# Patient Record
Sex: MALE | Race: ASIAN | NOT HISPANIC OR LATINO | URBAN - METROPOLITAN AREA
[De-identification: names, ages, dates, MRNs, and addresses within clinical notes are randomized per-mention and may not be internally consistent; named-entity substitution may affect disease eponyms.]

---

## 2023-02-11 ENCOUNTER — EMERGENCY (EMERGENCY)
Facility: HOSPITAL | Age: 73
LOS: 1 days | Discharge: HOME | End: 2023-02-11
Attending: EMERGENCY MEDICINE
Payer: MEDICARE

## 2023-02-11 VITALS
TEMPERATURE: 98 F | OXYGEN SATURATION: 99 % | SYSTOLIC BLOOD PRESSURE: 143 MMHG | DIASTOLIC BLOOD PRESSURE: 83 MMHG | HEIGHT: 65 IN | HEART RATE: 70 BPM | RESPIRATION RATE: 18 BRPM | WEIGHT: 138.01 LBS

## 2023-02-11 VITALS
RESPIRATION RATE: 21 BRPM | HEART RATE: 72 BPM | SYSTOLIC BLOOD PRESSURE: 120 MMHG | OXYGEN SATURATION: 100 % | DIASTOLIC BLOOD PRESSURE: 74 MMHG | TEMPERATURE: 99 F

## 2023-02-11 LAB
ALBUMIN SERPL ELPH-MCNC: 3.9 G/DL — SIGNIFICANT CHANGE UP (ref 3.3–5)
ALBUMIN SERPL ELPH-MCNC: 4.2 G/DL — SIGNIFICANT CHANGE UP (ref 3.3–5)
ALP SERPL-CCNC: 59 U/L — SIGNIFICANT CHANGE UP (ref 40–120)
ALP SERPL-CCNC: 62 U/L — SIGNIFICANT CHANGE UP (ref 40–120)
ALT FLD-CCNC: 28 U/L — SIGNIFICANT CHANGE UP (ref 10–45)
ALT FLD-CCNC: 28 U/L — SIGNIFICANT CHANGE UP (ref 10–45)
ANION GAP SERPL CALC-SCNC: 10 MMOL/L — SIGNIFICANT CHANGE UP (ref 5–17)
ANION GAP SERPL CALC-SCNC: 9 MMOL/L — SIGNIFICANT CHANGE UP (ref 5–17)
APTT BLD: 34.5 SEC — SIGNIFICANT CHANGE UP (ref 27.5–35.5)
AST SERPL-CCNC: 37 U/L — SIGNIFICANT CHANGE UP (ref 10–40)
AST SERPL-CCNC: 59 U/L — HIGH (ref 10–40)
BASOPHILS # BLD AUTO: 0.03 K/UL — SIGNIFICANT CHANGE UP (ref 0–0.2)
BASOPHILS NFR BLD AUTO: 0.4 % — SIGNIFICANT CHANGE UP (ref 0–2)
BILIRUB SERPL-MCNC: 0.3 MG/DL — SIGNIFICANT CHANGE UP (ref 0.2–1.2)
BILIRUB SERPL-MCNC: 0.6 MG/DL — SIGNIFICANT CHANGE UP (ref 0.2–1.2)
BUN SERPL-MCNC: 18 MG/DL — SIGNIFICANT CHANGE UP (ref 7–23)
BUN SERPL-MCNC: 18 MG/DL — SIGNIFICANT CHANGE UP (ref 7–23)
CALCIUM SERPL-MCNC: 10.1 MG/DL — SIGNIFICANT CHANGE UP (ref 8.4–10.5)
CALCIUM SERPL-MCNC: 9.8 MG/DL — SIGNIFICANT CHANGE UP (ref 8.4–10.5)
CHLORIDE SERPL-SCNC: 102 MMOL/L — SIGNIFICANT CHANGE UP (ref 96–108)
CHLORIDE SERPL-SCNC: 107 MMOL/L — SIGNIFICANT CHANGE UP (ref 96–108)
CO2 SERPL-SCNC: 23 MMOL/L — SIGNIFICANT CHANGE UP (ref 22–31)
CO2 SERPL-SCNC: 23 MMOL/L — SIGNIFICANT CHANGE UP (ref 22–31)
CREAT SERPL-MCNC: 1.34 MG/DL — HIGH (ref 0.5–1.3)
CREAT SERPL-MCNC: 1.4 MG/DL — HIGH (ref 0.5–1.3)
EGFR: 53 ML/MIN/1.73M2 — LOW
EGFR: 56 ML/MIN/1.73M2 — LOW
EOSINOPHIL # BLD AUTO: 0.42 K/UL — SIGNIFICANT CHANGE UP (ref 0–0.5)
EOSINOPHIL NFR BLD AUTO: 5.3 % — SIGNIFICANT CHANGE UP (ref 0–6)
FLUAV AG NPH QL: SIGNIFICANT CHANGE UP
FLUBV AG NPH QL: SIGNIFICANT CHANGE UP
GLUCOSE SERPL-MCNC: 172 MG/DL — HIGH (ref 70–99)
GLUCOSE SERPL-MCNC: 220 MG/DL — HIGH (ref 70–99)
HCT VFR BLD CALC: 47.9 % — SIGNIFICANT CHANGE UP (ref 39–50)
HGB BLD-MCNC: 15.3 G/DL — SIGNIFICANT CHANGE UP (ref 13–17)
IMM GRANULOCYTES NFR BLD AUTO: 0.3 % — SIGNIFICANT CHANGE UP (ref 0–0.9)
INR BLD: 1.09 RATIO — SIGNIFICANT CHANGE UP (ref 0.88–1.16)
LIDOCAIN IGE QN: 67 U/L — HIGH (ref 7–60)
LYMPHOCYTES # BLD AUTO: 1.56 K/UL — SIGNIFICANT CHANGE UP (ref 1–3.3)
LYMPHOCYTES # BLD AUTO: 19.8 % — SIGNIFICANT CHANGE UP (ref 13–44)
MCHC RBC-ENTMCNC: 28 PG — SIGNIFICANT CHANGE UP (ref 27–34)
MCHC RBC-ENTMCNC: 31.9 GM/DL — LOW (ref 32–36)
MCV RBC AUTO: 87.7 FL — SIGNIFICANT CHANGE UP (ref 80–100)
MONOCYTES # BLD AUTO: 0.78 K/UL — SIGNIFICANT CHANGE UP (ref 0–0.9)
MONOCYTES NFR BLD AUTO: 9.9 % — SIGNIFICANT CHANGE UP (ref 2–14)
NEUTROPHILS # BLD AUTO: 5.07 K/UL — SIGNIFICANT CHANGE UP (ref 1.8–7.4)
NEUTROPHILS NFR BLD AUTO: 64.3 % — SIGNIFICANT CHANGE UP (ref 43–77)
NRBC # BLD: 0 /100 WBCS — SIGNIFICANT CHANGE UP (ref 0–0)
PLATELET # BLD AUTO: 214 K/UL — SIGNIFICANT CHANGE UP (ref 150–400)
POTASSIUM SERPL-MCNC: 5.6 MMOL/L — HIGH (ref 3.5–5.3)
POTASSIUM SERPL-MCNC: SIGNIFICANT CHANGE UP (ref 3.5–5.3)
POTASSIUM SERPL-SCNC: 5.6 MMOL/L — HIGH (ref 3.5–5.3)
POTASSIUM SERPL-SCNC: SIGNIFICANT CHANGE UP (ref 3.5–5.3)
PROT SERPL-MCNC: 7 G/DL — SIGNIFICANT CHANGE UP (ref 6–8.3)
PROT SERPL-MCNC: 7.8 G/DL — SIGNIFICANT CHANGE UP (ref 6–8.3)
PROTHROM AB SERPL-ACNC: 12.6 SEC — SIGNIFICANT CHANGE UP (ref 10.5–13.4)
RBC # BLD: 5.46 M/UL — SIGNIFICANT CHANGE UP (ref 4.2–5.8)
RBC # FLD: 13.2 % — SIGNIFICANT CHANGE UP (ref 10.3–14.5)
RSV RNA NPH QL NAA+NON-PROBE: SIGNIFICANT CHANGE UP
SARS-COV-2 RNA SPEC QL NAA+PROBE: SIGNIFICANT CHANGE UP
SODIUM SERPL-SCNC: 135 MMOL/L — SIGNIFICANT CHANGE UP (ref 135–145)
SODIUM SERPL-SCNC: 139 MMOL/L — SIGNIFICANT CHANGE UP (ref 135–145)
TROPONIN T, HIGH SENSITIVITY RESULT: 8 NG/L — SIGNIFICANT CHANGE UP (ref 0–51)
TROPONIN T, HIGH SENSITIVITY RESULT: 9 NG/L — SIGNIFICANT CHANGE UP (ref 0–51)
WBC # BLD: 7.88 K/UL — SIGNIFICANT CHANGE UP (ref 3.8–10.5)
WBC # FLD AUTO: 7.88 K/UL — SIGNIFICANT CHANGE UP (ref 3.8–10.5)

## 2023-02-11 PROCEDURE — 99285 EMERGENCY DEPT VISIT HI MDM: CPT | Mod: 25

## 2023-02-11 PROCEDURE — 93005 ELECTROCARDIOGRAM TRACING: CPT

## 2023-02-11 PROCEDURE — 87637 SARSCOV2&INF A&B&RSV AMP PRB: CPT

## 2023-02-11 PROCEDURE — 71045 X-RAY EXAM CHEST 1 VIEW: CPT | Mod: 26

## 2023-02-11 PROCEDURE — 85610 PROTHROMBIN TIME: CPT

## 2023-02-11 PROCEDURE — 83690 ASSAY OF LIPASE: CPT

## 2023-02-11 PROCEDURE — 80053 COMPREHEN METABOLIC PANEL: CPT

## 2023-02-11 PROCEDURE — 85730 THROMBOPLASTIN TIME PARTIAL: CPT

## 2023-02-11 PROCEDURE — 99285 EMERGENCY DEPT VISIT HI MDM: CPT | Mod: CS

## 2023-02-11 PROCEDURE — 85379 FIBRIN DEGRADATION QUANT: CPT

## 2023-02-11 PROCEDURE — 71045 X-RAY EXAM CHEST 1 VIEW: CPT

## 2023-02-11 PROCEDURE — 85025 COMPLETE CBC W/AUTO DIFF WBC: CPT

## 2023-02-11 PROCEDURE — 84484 ASSAY OF TROPONIN QUANT: CPT

## 2023-02-11 RX ORDER — ASPIRIN/CALCIUM CARB/MAGNESIUM 324 MG
162 TABLET ORAL ONCE
Refills: 0 | Status: COMPLETED | OUTPATIENT
Start: 2023-02-11 | End: 2023-02-11

## 2023-02-11 RX ADMIN — Medication 162 MILLIGRAM(S): at 11:05

## 2023-02-11 NOTE — ED PROVIDER NOTE - NSFOLLOWUPINSTRUCTIONS_ED_ALL_ED_FT
1. You presented to the emergency department for:     2. Your evaluation in the emergency department included a physician evaluation. Your work-up did not reveal any findings indicating the need for admission to the hospital or any emergent interventions at this time.     3. It is recommended that you follow-up with Dr. Real on Monday. Call the office at 9 AM    If needed, to arrange an appointment with a primary care provider please call: [phone]    4. Please continue taking your regular medications as prescribed.     For pain you may take 400-600 mg IBUPROFEN or 500-1000mg ACETAMINOPHEN every 6-8 hours - as needed.  This is an over-the-counter medication - please read the instructions for use and warnings on the label. If you have any questions regarding its use, you may refer them to your local pharmacist.    5. PLEASE RETURN TO THE EMERGENCY DEPARTMENT IMMEDIATELY IF you develop any fevers not responding to over the counter medications, uncontrollable nausea and vomiting, an inability to tolerate eating and drinking, difficulty breathing, chest pain, a severe increase in your symptoms or pain, or any other new symptoms that concern you. 1. You presented to the emergency department for: CHEST PAIN.    2. Your evaluation in the emergency department included a physician evaluation. Your work-up did not reveal any findings indicating the need for admission to the hospital or any emergent interventions at this time.     3. It is recommended that you follow-up with Dr. Real on Monday. Call the office at 9 AM to make the appointment.     If needed, to arrange an appointment with a primary care provider please call: [phone]    4. Please continue taking your regular medications as prescribed.     5. PLEASE RETURN TO THE EMERGENCY DEPARTMENT IMMEDIATELY IF you develop any fevers not responding to over the counter medications, uncontrollable nausea and vomiting, an inability to tolerate eating and drinking, difficulty breathing, chest pain, a severe increase in your symptoms or pain, or any other new symptoms that concern you.

## 2023-02-11 NOTE — ED PROVIDER NOTE - ATTENDING CONTRIBUTION TO CARE
RGUJRAL 73-year-old male history listed brought in by his wife for chest pain since yesterday.  Patient states that pain started after drinking water and felt something was stuck. Denies history of reflux or gastritis.  Patient had recurrent episode this morning wo any food. Pt unable to describe type of pain, complained of dull pressure to wife.  Denies any shortness of breath or cough, abdominal pain.  Patient did travel to [place] by car returned 1 week ago.  Also had a stress test 1 year ago.  As per wife patient appeared more fatigued this morning and was concerned and brought him to the hospital.  On exam.   Check labs evaluate for not limited to ACS, PE. Patient has been tolerating p.o. including dinner and breakfast this morning without any symptoms.

## 2023-02-11 NOTE — ED ADULT NURSE NOTE - CINV DISCH MEDS REVIEWED YN
-- DO NOT REPLY / DO NOT REPLY ALL --  -- Message is from the [facility]--    COVID-19 Universal Screening: Negative    General Patient Message      Reason for Call: Please follow up with patient as soon as possible. She was trying to schedule an appointment for labs. There are no recent order for labs.  on 21.     Caller Information       Type Contact Phone    04/15/2021 12:55 PM CDT Phone (Incoming) Aditi Sow (Self) [phone] (M)          Alternative phone number: None     Turnaround time given to caller:   \"This message will be sent to [state Provider's name]. The clinical team will fulfill your request as soon as they review your message.\"    
Labs extended, patient notified via telephone  
Yes

## 2023-02-11 NOTE — ED PROVIDER NOTE - OBJECTIVE STATEMENT
73-year-old male with history of type 2 diabetes, hyperlipidemia, hypothyroidism presenting with intermittent chest pain since yesterday.  Describes pain as midsternal, nonradiating.  Started while he was drinking water and self resolved.  Reports recurrent episodes since, nonexertional.  Walked on a treadmill this morning without symptoms.  Wife at bedside states that he just did not look himself.  Patient reports normal nuclear stress test 1 year ago.  No additional cardiac work-up.  Remote history of smoking, quit 30 to 40 years ago. Denies shortness of breath, palpitations, diaphoresis.  Recently traveled long distance but denies leg swelling, pleuritic chest pain.

## 2023-02-11 NOTE — ED ADULT TRIAGE NOTE - CHIEF COMPLAINT QUOTE
chest tightness that began yesterday night while drinking water. Took tums with mild relief.  Pain is midsternal does not radiate

## 2023-02-11 NOTE — ED PROVIDER NOTE - CLINICAL SUMMARY MEDICAL DECISION MAKING FREE TEXT BOX
73-year-old no previous cardiac history, history of diabetes and hyperlipidemia presenting with intermittent chest pain.  Nonexertional nonradiating midsternal pain.  Currently asymptomatic.  EKG with T wave inversions laterally and aVL, no ST elevations.  Overall moderate risk for cardiac cause of pain.  Will get labs, troponin, chest x-ray, give aspirin and discuss disposition with patient's primary doctor (zachary Real), anticipate CDU versus admission.

## 2023-02-11 NOTE — ED ADULT NURSE NOTE - OBJECTIVE STATEMENT
74 y/o M A&O x3 w/ PMH of diabetes, HLD and hypothyroidism presents to the ED complaining of chest pain. Pt states that midsternal pain started yesterday when he was drinking water. Pt states that he took a Tums today and had chest pain relief. Pt also states he was on the treadmill this morning and did not experience any pain. Pt's wife is physician and wanted to bring him to ED. Pt states he saw cardiologist 2 weeks ago and successfully completed stress test. Pt denies that pain radiates anywhere and states it is a "chest tightness." Pt states he was sob yesterday but denies current sob. Pt also denies n/v/d, fever, chills, body aches or weakness. Pt safety and comfort provided.

## 2023-02-11 NOTE — ED PROVIDER NOTE - PATIENT PORTAL LINK FT
You can access the FollowMyHealth Patient Portal offered by [facility] by registering at the following website: [email or web address]. By joining [facility]’s FollowMyHealth portal, you will also be able to view your health information using other applications (apps) compatible with our system.

## 2023-02-11 NOTE — ED PROVIDER NOTE - PROGRESS NOTE DETAILS
EKG compared to prior and no changes, with lateral inversions in I, avl, v5,6. RGUJRAL Discussed with patient and wife. States they will get an outpatient stress test, do not want to wait for delta trop at this time. Risk and return precautions discussed. RGUJRAL

## 2024-03-09 NOTE — ED ADULT TRIAGE NOTE - NS ED TRIAGE AVPU SCALE
Alert-The patient is alert, awake and responds to voice. The patient is oriented to time, place, and person. The triage nurse is able to obtain subjective information.
FAMILY HISTORY:  No pertinent family history in first degree relatives

## 2025-02-03 NOTE — ED PROVIDER NOTE - NSICDXPASTMEDICALHX_GEN_ALL_CORE_FT
Cortisone Injection    You have received a cortisone injection. The medication is a combination of a short acting anesthetic (numbing medication)  plus a steroid.     You may see some relief from the pain for several hours following the injection due to the numbing medication. Later that day or the next day you may have the same pain you had before or an increase in soreness. Swelling can also occur.  You may try a cold compress for 20 minutes on and 20 minutes off that day or over the counter medications with food (if not allergic)    Our expectation would be that you will improve on a day by day basis for the next several weeks or even longer.     Cortisone may cause a temporary (usually 2-3 days) increase in blood glucose (sugar) levels. If you have diabetes, please pay particular attention to this.     Please call the office if there are no signs of improvement after 4 weeks or if other problems develop such as an increase in swelling or redness. Our office number is [phone]    Thank you for allowing us to be of service to you.     [facility].         SHOULDER EXERCISES    Do not ignore pain: You should not feel pain during an exercise. Talk to your doctor or physical therapist if you have any pain while exercising.    Ask questions: If you are not sure how to do an exercise, or how often to do it, contact your doctor or physical therapist.    1. Pendulum    Main muscles worked: Deltoids, supraspinatus, infraspinatus, subscapularis    Equipment needed: None    Repetitions: 2 sets of 10  Days Per Week: 5 to 6    Step-by-step directions    Lean forward and place one hand on a counter or table for support. Let your other arm hang freely at your side.  Gently swing your arm forward and back. Repeat the exercise moving your arm side-to-side, and repeat again in a circular motion.  Repeat the entire sequence with the other arm.  Tip: Do not round your back or lock your knees.      2. Crossover  Arm Stretch    Main muscles worked: Posterior deltoid  You should feel this stretch at the back of your shoulder    Equipment needed: None    Repetitions: 4 each side  Days Per Week: 5 to 6    Step-by-step directions    Relax your shoulders and gently pull one arm across your chest as far as possible, holding at your upper arm.  Hold the stretch for 30 seconds and then relax for 30 seconds.  Repeat with the other arm.  Tip: Do not pull or put pressure on your elbow.      3. Passive Internal Rotation    Main muscles worked: Supraspinatus  You should feel this stretch at the front of your shoulder    Equipment needed: Light stick, such as a yardstick (wooden ruler)    Repetitions: 4 each side  Days Per Week: 5 to 6    Step-by-step directions    Hold a stick behind your back with one hand, and lightly grasp the other end of the stick with your other hand.  Pull the stick horizontally as shown so that your shoulder is passively stretched to the point of feeling a pull without pain.  Hold for 30 seconds and then relax for 30 seconds.  Repeat on the other side.  Tip: Do not lean over or twist to side while pulling the stick.      4. Passive External Rotation    Main muscles worked: Infraspinatus, teres minor  You should feel this stretch in the back of your shoulder    Equipment needed: Light stick, such as a yardstick (wooden ruler)    Repetitions: 4 each side  Days Per Week: 5 to 6    Step-by-step directions    Grasp the stick with one hand and cup the other end of the stick with the other hand.  Keep the elbow of the shoulder you are stretching against the side of your body and push the stick horizontally, as shown, to the point of feeling a pull without pain.  Hold for 30 seconds and then relax for 30 seconds.  Repeat on the other side.  Tip: Keep your hips facing forward and do not twist.      5. Sleeper Stretch    Main muscles worked: Infraspinatus, teres minor  You should feel this stretch in your outer upper back,  behind your shoulder    Equipment needed: None    Repetitions: 4 reps, 3x a day  Days Per Week: Daily    Step-by-step directions    Lie on your side on a firm, flat surface with the affected shoulder under you and your arm bent, as shown. You can place your head on a pillow for comfort, if needed.  Use your unaffected arm to push your other arm down. Stop pressing down when you feel a stretch in the back of your affected shoulder.  Hold this position for 30 seconds, then relax your arm for 30 seconds.  Tip: Do not bend your wrist or press down on your wrist.      6. Standing Row    Main muscles worked: Middle and lower trapezius  You should feel this exercise at the back of your shoulder and into your upper back    Equipment needed: Use an elastic stretch band of comfortable resistance. As the exercise becomes easier to perform, progress to 3 sets of 12 repetitions. If you have access to a fitness center, this exercise can also be performed on a weight machine. A  at your gym can instruct you on how to use the machines safely.    Repetitions: 3 sets of 8  Days Per Week: 3    Step-by-step directions    Make a 3-foot-long loop with the elastic band and tie the ends together. Attach the loop to a doorknob or other stable object.  Stand holding the band with your elbow bent and at your side, as shown in the start position.  Keep your arm close to your side and slowly pull your elbow straight back.  Slowly return to the start position and repeat.  Tip: Squeeze your shoulder blades together as you pull.      7. External Rotation with Arm Abducted 90°    Main muscles worked: Infraspinatus and teres minor  You should feel this exercise at the back of your shoulder and into your upper back    Equipment needed: Use an elastic stretch band of comfortable resistance. As the exercise becomes easier to perform, progress to 3 sets of 12 repetitions. If you have access to a fitness center, this exercise can also be  performed on a weight machine. A  at your gym can instruct you on how to use the machines safely.    Repetitions: 3 sets of 8  Days Per Week: 3    Step-by-step directions    Make a 3-foot-long loop with the elastic band and tie the ends together. Attach the loop to a doorknob or other stable object.  Stand holding the band with your elbow bent 90° and raised to shoulder-height, as shown in the start position.  Keeping your shoulder and elbow level, slowly raise your hand until it is in line with your head.  Slowly return to the start position and repeat.  Tip: Make sure your elbow stays in line with your shoulder.      8. Internal Rotation    Main muscles worked: Pectoralis, subscapularis  You should feel this exercise at your chest and shoulder    Equipment needed: Use an elastic stretch band of comfortable resistance. As the exercise becomes easier to perform, progress to 3 sets of 12 repetitions. If you have access to a fitness center, this exercise can also be performed on a weight machine. A  at your gym can instruct you on how to use the machines safely.    Repetitions: 3 sets of 8  Days Per Week: 3    Step-by-step directions    Make a 3-foot-long loop with the elastic band and tie the ends together. Attach the loop to a doorknob or other stable object.  Stand holding the band with your elbow bent and at your side, as shown in the start position.  Keep your elbow close to your side and bring your arm across your body.  Slowly return to the start position and repeat.  Tip: Keep your elbow pressed into your side.      9. External Rotation    Main muscles worked: Infraspinatus, teres minor, posterior deltoid  You should feel this stretch in the back of your shoulder and upper back    Equipment needed: Use an elastic stretch band of comfortable resistance. As the exercise becomes easier to perform, progress to 3 sets of 12 repetitions. If you have access to a fitness center, this  exercise can also be performed on a weight machine. A  at your gym can instruct you on how to use the machines safely.    Repetitions: 3 sets of 8  Days Per Week: 3    Step-by-step directions    Make a 3-foot-long loop with the elastic band and tie the ends together.  Attach the loop to a doorknob or other stable object.  Stand holding the band with your elbow bent and at your side, as shown in the start position.  Keeping your elbow close to your side, slowly rotate your arm outward.  Slowly return to the start position and repeat.  Tip: Squeeze your shoulder blades together when you pull your elbow back.      10. Elbow Flexion    Main muscles worked: Biceps, brachialis  You should feel this exercise at the front of your upper arm    Equipment needed: Begin with a weight that allows 3 sets of 8 repetitions and progress to 3 sets of 12 repetitions. As the exercise becomes easier, add weight in 1-pound increments to a maximum of 10 to 15 pounds. Each time you increase the weight, start again at 3 sets of 8 repetitions.    Repetitions: 3 sets of 8  Days Per Week: 3    Step-by-step directions    Stand tall with your weight evenly distributed over both feet.  Keep your elbow close to your side and slowly bring the weight up toward your shoulder as shown.  Hold for 2 seconds.  Slowly return to the starting position and repeat.  Tip: Do not do the exercise too quickly or swing your arm.      11. Elbow Extension    Main muscles worked: Triceps  You should feel this exercise at the back of your upper arm    Equipment needed: Begin with a weight that allows 3 sets of 8 repetitions and progress to 3 sets of 12 repetitions. As the exercise becomes easier, add weight in 1-pound increments to a maximum of 10 pounds. Each time you increase the weight, start again at 3 sets of 8 repetitions.    Repetitions: 3 sets of 8  Days Per Week: 3    Step-by-step directions    Stand tall with your weight evenly distributed  chest pain over both feet.  Raise your arm and bend your elbow with the weight behind your head.  Support your arm by placing your opposite hand on your upper arm.  Slowly straighten your elbow and bring the weight overhead.  Hold for 2 seconds.  Slowly lower your arm back down behind your head and repeat.  Tip: Keep your abdominal muscles tight and do not arch your back.      12. Trapezius Strengthening    Main muscles worked: Middle and posterior deltoid, supraspinatus, middle trapezius  You should feel this exercise at the back of your shoulder and into your upper back    Equipment needed: Begin with a light enough weight to allow 3 to 4 sets of 20 repetitions without pain. As the exercise becomes easier to perform, add 2 to 3 pounds of weight, but do fewer repetitions. Progress to 3 sets of 15 repetitions at each weight increment, with the maximum weight approximately 5 to 7 pounds.    Repetitions: 3 sets of 20  Days Per Week: 3 to 5    Step-by-step directions    Place your knee on a bench or chair and lean forward so that your hand reaches the bench and helps support your weight. Your other hand is at your side, palm facing your body.  Slowly raise your arm, rotating your hand to the thumbs-up position and stopping when your hand is shoulder height, with your arm parallel to the floor.  Slowly lower your arm to the original position to a count of 5.  Tip: Use a weight that makes the last few repetitions difficult, but pain-free.      13. Scapula Setting    Main muscles worked: Middle trapezius, serratus  You should feel this exercise in your upper back, at your shoulder blade    Equipment needed: None    Repetitions: 10  Days Per Week: 3    Step-by-step directions    Lie on your stomach with your arms by your sides.  Place a pillow under your forehead for comfort, if required.  Gently draw your shoulder blades together and down your back as far as possible.  Ease about [facility] off from this position and hold for 10  seconds.  Relax and repeat 10 times.  Tip: Do not tense up in your neck.      14. Scapular Retraction/Protraction    Main muscles worked: Middle trapezius, serratus  You should feel this exercise in your upper back at your shoulder blade    Equipment needed: Begin with a weight that allows 2 sets of 8 to 10 repetitions and progress to 3 sets of 15 repetitions. As the exercise becomes easier, add weight in 1-pound increments to a maximum of 5 pounds. Each time you increase the weight, start again at 2 sets of 8 to 10 repetitions.    Repetitions: 2 sets of 10  Days Per Week: 3    Step-by-step directions    Lie on your stomach on a table or bed with your injured arm hanging over the side.  Keep your elbow straight and lift the weight slowly by squeezing your shoulder blade toward the opposite side as far as possible.  Return slowly to the starting position and repeat.  Tip: Do not shrug your shoulder toward your ear.      15. Bent-Over Horizontal Abduction    Main muscles worked: Middle and lower trapezius, Infraspinatus, teres minor, posterior deltoid  You should feel this exercise at the back of your shoulder and into your upper back    Equipment needed: Begin with a weight that allows 3 sets of 8 repetitions and progress to 3 sets of 12 repetitions. As the exercise becomes easier, add weight in 1-pound increments to a maximum of 5 pounds. Each time you increase the weight, start again at 3 sets of 8 repetitions.    Repetitions: 3 sets of 8  Days Per Week: 3    Step-by-step directions    Lie on your stomach on a table or bed with your injured arm hanging over the side.  Keep your arm straight and slowly raise it up to eye level.  Slowly lower it back to the starting position and repeat.  Tip: Control the movement as you lower the weight.      16. Internal and External Rotation    Main muscles worked: Internal rotation - anterior deltoid, pectoralis, subscapularis, latissimus  External rotation - infraspinatus, teres  minor, posterior deltoid    You should feel this exercise in the front and back of your shoulder, your chest, and upper back    Equipment needed: Begin with a light enough weight to allow 3 to 4 sets of 20 repetitions without pain. As the exercise becomes easier to perform, add 2 to 3 pounds of weight, but do fewer repetitions. Progress to 3 sets of 15 repetitions at each weight increment, with the maximum weight approximately 5 to 10 pounds.    Repetitions: 3 to 4 sets of 20  Days Per Week: 3 to 5    Step-by-step directions    Lie on your back on a flat surface.  Extend your arm straight out from the shoulder and bend the elbow 90° so that your fingers are pointed up.  Keeping your elbow bent and on the floor, slowly move your arm in the arc shown. Bring your elbow down to a 45° angle if you experience pain at 90°.  Tip: Use a weight that makes the last few repetitions difficult, but pain-free.      17. External Rotation    Main muscles worked: Infraspinatus, teres minor, posterior deltoid  You should feel this stretch in the back of your shoulder and upper back    Equipment needed: Begin with weights that allow 2 sets of 8 to 10 repetitions (approximately 1 to 2 pounds), and progress to 3 sets of 5 repetitions. As the exercise becomes easier, add weight in 1-pound increments to a maximum of 5 to 10 pounds. Each time you increase the weight, start again at 2 sets of 8 to 10 repetitions.    Repetitions: 2 sets of 10  Days Per Week: 3    Step-by-step directions    Lie on your side on a firm, flat surface with your unaffected arm under you, cradling your head.  Hold your injured arm against your side as shown, with your elbow bent at a 90° angle.  Keep your elbow against your side and slowly rotate your arm at the shoulder, raising the weight to a vertical position.  Slowly lower the weight to the starting position to a count of 5.  Tip: Do not let your body roll back as you raise the weight.      18. Internal  Rotation    Main muscles worked: Subscapularis, teres major  You should feel this stretch in the front of your shoulder    Equipment needed: Begin with weights that allow 2 sets of 8 to 10 repetitions (approximately 1 to 2 pounds), and progress to 3 sets of 5 repetitions. As the exercise becomes easier, add weight in 1-pound increments to a maximum of 5 to 10 pounds. Each time you increase the weight, start again at 2 sets of 8 to 10 repetitions.    Repetitions: 2 sets of 10  Days Per Week: 3    Step-by-step directions    Lie on a firm, flat surface on the side of your affected arm.  Place a pillow or folded cloth under your head to keep your spine straight.  Hold your injured arm against your side as shown, with your elbow bent at a 90° angle.  Keep your elbow bent and against your body and slowly rotate your arm at the shoulder, raising the weight to a vertical position.  Slowly lower the weight to the starting position.  Tip: Do not let your body roll back as you raise the weight.         PAST MEDICAL HISTORY:  HLD (hyperlipidemia)     Hypothyroidism     Type 2 diabetes mellitus